# Patient Record
Sex: FEMALE | Race: WHITE | NOT HISPANIC OR LATINO | Employment: UNEMPLOYED | URBAN - METROPOLITAN AREA
[De-identification: names, ages, dates, MRNs, and addresses within clinical notes are randomized per-mention and may not be internally consistent; named-entity substitution may affect disease eponyms.]

---

## 2020-01-22 ENCOUNTER — HOSPITAL ENCOUNTER (EMERGENCY)
Facility: HOSPITAL | Age: 55
Discharge: HOME | End: 2020-01-22
Attending: EMERGENCY MEDICINE | Admitting: EMERGENCY MEDICINE

## 2020-01-22 VITALS
SYSTOLIC BLOOD PRESSURE: 104 MMHG | DIASTOLIC BLOOD PRESSURE: 54 MMHG | RESPIRATION RATE: 16 BRPM | TEMPERATURE: 97.4 F | HEART RATE: 89 BPM | OXYGEN SATURATION: 96 % | HEIGHT: 65 IN

## 2020-01-22 DIAGNOSIS — J02.9 VIRAL PHARYNGITIS: Primary | ICD-10-CM

## 2020-01-22 DIAGNOSIS — R05.9 COUGH: ICD-10-CM

## 2020-01-22 PROCEDURE — 99282 EMERGENCY DEPT VISIT SF MDM: CPT

## 2020-01-22 RX ORDER — BUSPIRONE HYDROCHLORIDE 15 MG/1
15 TABLET ORAL 3 TIMES DAILY
COMMUNITY

## 2020-01-22 RX ORDER — TRAZODONE HYDROCHLORIDE 50 MG/1
50 TABLET ORAL NIGHTLY
COMMUNITY

## 2020-01-22 RX ORDER — OMEPRAZOLE 20 MG/1
20 CAPSULE, DELAYED RELEASE ORAL DAILY
COMMUNITY

## 2020-01-22 RX ORDER — PREDNISONE 50 MG/1
50 TABLET ORAL DAILY
Qty: 3 TABLET | Refills: 0 | Status: SHIPPED | OUTPATIENT
Start: 2020-01-22 | End: 2020-01-25

## 2020-01-22 RX ORDER — PREDNISONE 10 MG/1
10 TABLET ORAL AS NEEDED
COMMUNITY
End: 2020-01-22

## 2020-01-22 RX ORDER — BENZONATATE 100 MG/1
100 CAPSULE ORAL 3 TIMES DAILY PRN
Qty: 9 CAPSULE | Refills: 0 | Status: SHIPPED | OUTPATIENT
Start: 2020-01-22 | End: 2020-01-25

## 2020-01-22 RX ORDER — LOSARTAN POTASSIUM 25 MG/1
25 TABLET ORAL DAILY
COMMUNITY

## 2020-01-22 NOTE — ED PROVIDER NOTES
EMERGENCY DEPARTMENT ENCOUNTER    Room Number:  33/33  Date of encounter:  1/22/2020  PCP: No primary care provider on file.  Historian: Patient      HPI:  Chief Complaint: Sore throat  A complete HPI/ROS/PMH/PSH/SH/FH are unobtainable due to: None    Context: Jenae Hernandes is a 54 y.o. female who presents to the ED c/o sore throat.  Onset 2 days ago.  Symptoms are constant.  Improved by nothing but worsens by nothing.  She has tried salt water rinses in her throat.  She is currently going through a sobriety program.  She reports sick contact with flu over, she does not think that she has the flu.  She reports having associated nonproductive cough as well as mild dull headache which she attributes to her not drinking.  No fever.  No shortness of breath.      PAST MEDICAL HISTORY  Active Ambulatory Problems     Diagnosis Date Noted   • No Active Ambulatory Problems     Resolved Ambulatory Problems     Diagnosis Date Noted   • No Resolved Ambulatory Problems     Past Medical History:   Diagnosis Date   • Diabetes mellitus (CMS/[facility])    • Hyperlipidemia    • Hypertension          PAST SURGICAL HISTORY  History reviewed. No pertinent surgical history.      FAMILY HISTORY  History reviewed. No pertinent family history.      SOCIAL HISTORY         ALLERGIES  Ciprofloxacin and Lortab [hydrocodone-acetaminophen]        REVIEW OF SYSTEMS  Review of Systems     All systems reviewed and negative except for those discussed in HPI.       PHYSICAL EXAM    I have reviewed the triage vital signs and nursing notes.    ED Triage Vitals   Temp Heart Rate Resp BP SpO2   01/22/20 0802 01/22/20 0802 01/22/20 0802 01/22/20 0818 01/22/20 0802   97.4 °F (36.3 °C) 92 18 104/54 96 %      Temp src Heart Rate Source Patient Position BP Location FiO2 (%)   01/22/20 0802 01/22/20 0802 -- -- --   Tympanic Monitor          Physical Exam  GENERAL: not distressed  HENT: nares patent, mild oropharyngeal erythema, uvula midline, no exudates  LYMPH:  No cervical lymphadenopathy  EYES: no scleral icterus  CV: regular rhythm, regular rate  RESPIRATORY: normal effort, clear to auscultation bilaterally  ABDOMEN: soft, obese abdomen, nontender  MUSCULOSKELETAL: no deformity  NEURO: alert, moves all extremities, follows commands  SKIN: warm, dry        LAB RESULTS  No results found for this or any previous visit (from the past 24 hour(s)).    Ordered the above labs and independently reviewed the results.        RADIOLOGY  No Radiology Exams Resulted Within Past 24 Hours    I ordered the above noted radiological studies. Reviewed by me and discussed with radiologist.  See dictation for official radiology interpretation.      PROCEDURES    Procedures      MEDICATIONS GIVEN IN ER    Medications - No data to display      PROGRESS, DATA ANALYSIS, CONSULTS, AND MEDICAL DECISION MAKING    All labs have been independently reviewed by me.  All radiology studies have been reviewed by me and discussed with radiologist dictating the report.   EKG's independently viewed and interpreted by me.  Discussion below represents my analysis of pertinent findings related to patient's condition, differential diagnosis, treatment plan and final disposition.    Patient presents today requesting a Z-Luan.  I do not think this is bacterial in nature and do not believe it is appropriate to prescribe this.  Patient really would like some medicine help with her symptoms.  Therefore many give her some prednisone and Tessalon Perles.             AS OF 8:21 AM VITALS:    BP - 104/54  HR - 89  TEMP - 97.4 °F (36.3 °C) (Tympanic)  O2 SATS - 96%        DIAGNOSIS  Final diagnoses:   Viral pharyngitis   Cough         DISPOSITION  DISCHARGE    FOLLOW-UP  PATIENT LIAISON [facility] [address]  [phone]  Schedule an appointment as soon as possible for a visit in 1 day  If symptoms worsen         Medication List      New Prescriptions    benzonatate 100 MG capsule  Commonly known as:   TESSALON  Take 1 capsule by mouth 3 (Three) Times a Day As Needed for Cough for up   to 3 days.        Changed    predniSONE 50 MG tablet  Commonly known as:  DELTASONE  Take 1 tablet by mouth Daily for 3 days.  What changed:    medication strength  how much to take  when to take this  reasons to take this                   Corey Frias II, MD  01/22/20 3464

## 2020-01-22 NOTE — ED TRIAGE NOTES
"Pt complains of a sore throat for the last 2 days. States that she also has a headache. Pt states \"I've been in sober living. I've been sober for 10 days.\" Reports that her roommate had the flu 2 weeks ago.   "

## 2020-01-28 ENCOUNTER — HOSPITAL ENCOUNTER (EMERGENCY)
Facility: HOSPITAL | Age: 55
Discharge: HOME | End: 2020-01-28
Attending: EMERGENCY MEDICINE | Admitting: EMERGENCY MEDICINE

## 2020-01-28 ENCOUNTER — APPOINTMENT (OUTPATIENT)
Dept: GENERAL RADIOLOGY | Facility: HOSPITAL | Age: 55
End: 2020-01-28

## 2020-01-28 VITALS
HEART RATE: 87 BPM | HEIGHT: 65 IN | SYSTOLIC BLOOD PRESSURE: 136 MMHG | WEIGHT: 248 LBS | DIASTOLIC BLOOD PRESSURE: 78 MMHG | BODY MASS INDEX: 41.32 KG/M2 | OXYGEN SATURATION: 98 % | RESPIRATION RATE: 17 BRPM | TEMPERATURE: 98.3 F

## 2020-01-28 DIAGNOSIS — R06.2 WHEEZING: ICD-10-CM

## 2020-01-28 DIAGNOSIS — J06.9 VIRAL URI WITH COUGH: Primary | ICD-10-CM

## 2020-01-28 DIAGNOSIS — F17.200 TOBACCO DEPENDENCE: ICD-10-CM

## 2020-01-28 PROCEDURE — 71046 X-RAY EXAM CHEST 2 VIEWS: CPT

## 2020-01-28 PROCEDURE — 94640 AIRWAY INHALATION TREATMENT: CPT

## 2020-01-28 PROCEDURE — 63710000001 PREDNISONE PER 1 MG: Performed by: EMERGENCY MEDICINE

## 2020-01-28 PROCEDURE — 99283 EMERGENCY DEPT VISIT LOW MDM: CPT

## 2020-01-28 RX ORDER — BENZONATATE 100 MG/1
100 CAPSULE ORAL 3 TIMES DAILY PRN
Qty: 9 CAPSULE | Refills: 0 | Status: SHIPPED | OUTPATIENT
Start: 2020-01-28 | End: 2020-01-31

## 2020-01-28 RX ORDER — PREDNISONE 50 MG/1
50 TABLET ORAL DAILY
Qty: 5 TABLET | Refills: 0 | Status: SHIPPED | OUTPATIENT
Start: 2020-01-28 | End: 2020-02-02

## 2020-01-28 RX ORDER — PREDNISONE 20 MG/1
60 TABLET ORAL ONCE
Status: COMPLETED | OUTPATIENT
Start: 2020-01-28 | End: 2020-01-28

## 2020-01-28 RX ORDER — IPRATROPIUM BROMIDE AND ALBUTEROL SULFATE 2.5; .5 MG/3ML; MG/3ML
3 SOLUTION RESPIRATORY (INHALATION) ONCE
Status: COMPLETED | OUTPATIENT
Start: 2020-01-28 | End: 2020-01-28

## 2020-01-28 RX ORDER — AMOXICILLIN AND CLAVULANATE POTASSIUM 875; 125 MG/1; MG/1
1 TABLET, FILM COATED ORAL 2 TIMES DAILY
Qty: 10 TABLET | Refills: 0 | Status: SHIPPED | OUTPATIENT
Start: 2020-01-28 | End: 2020-02-02

## 2020-01-28 RX ADMIN — IPRATROPIUM BROMIDE AND ALBUTEROL SULFATE 3 ML: 2.5; .5 SOLUTION RESPIRATORY (INHALATION) at 10:27

## 2020-01-28 RX ADMIN — PREDNISONE 60 MG: 20 TABLET ORAL at 10:37

## 2020-01-28 NOTE — ED PROVIDER NOTES
EMERGENCY DEPARTMENT ENCOUNTER    Room Number:  38/38  Date of encounter:  1/28/2020  PCP: Rony Damon II, MD  Historian: Patient      HPI:  Chief Complaint: Cough  A complete HPI/ROS/PMH/PSH/SH/FH are unobtainable due to: None    Context: Jenae Hernandes is a 54 y.o. female who presents to the ED c/o cough.  Onset 7 days ago.  Symptoms are constant.  Improved by nothing.  Worsens by nothing.  She reports numerous sick contacts with the flu although she does not think that she has a flu.  She is currently living at sober living.  She does have any fever or shortness of breath.  She states that she would like some antibiotics.  She is a smoker.      PAST MEDICAL HISTORY  Active Ambulatory Problems     Diagnosis Date Noted   • No Active Ambulatory Problems     Resolved Ambulatory Problems     Diagnosis Date Noted   • No Resolved Ambulatory Problems     Past Medical History:   Diagnosis Date   • Diabetes mellitus (CMS/[facility])    • Hyperlipidemia    • Hypertension          PAST SURGICAL HISTORY  History reviewed. No pertinent surgical history.      FAMILY HISTORY  History reviewed. No pertinent family history.      SOCIAL HISTORY  Social History     Socioeconomic History   • Marital status: Single     Spouse name: Not on file   • Number of children: Not on file   • Years of education: Not on file   • Highest education level: Not on file   Tobacco Use   • Smoking status: Former Smoker   Substance and Sexual Activity   • Alcohol use: Never     Frequency: Never   • Drug use: Never   • Sexual activity: Defer         ALLERGIES  Ciprofloxacin and Lortab [hydrocodone-acetaminophen]        REVIEW OF SYSTEMS  Review of Systems     All systems reviewed and negative except for those discussed in HPI.       PHYSICAL EXAM    I have reviewed the triage vital signs and nursing notes.    ED Triage Vitals   Temp Heart Rate Resp BP SpO2   01/28/20 0716 01/28/20 0716 01/28/20 0716 01/28/20 0755 01/28/20 0716   98.3 °F (36.8 °C) 91 16  147/83 98 %      Temp src Heart Rate Source Patient Position BP Location FiO2 (%)   01/28/20 0716 01/28/20 0716 -- -- --   Tympanic Monitor          Physical Exam  GENERAL: not distressed  HENT: nares patent, TMs clear bilaterally  Oropharynx clear, uvula midline  LYMPH: No cervical lymphadenopathy  EYES: no scleral icterus  CV: regular rhythm, regular rate  RESPIRATORY: normal effort, expiratory wheezing bilaterally  ABDOMEN: soft, nontender  MUSCULOSKELETAL: no deformity  NEURO: alert, moves all extremities, follows commands  SKIN: warm, dry        LAB RESULTS  No results found for this or any previous visit (from the past 24 hour(s)).    Ordered the above labs and independently reviewed the results.        RADIOLOGY  Xr Chest 2 View    Result Date: 1/28/2020  Chest radiograph  HISTORY:Cough  TECHNIQUE: Two PA and lateral radiographs  COMPARISON:None  FINDINGS: The lungs are clear. There is no pleural effusion. No pneumothorax is seen. The heart is normal in size.      No findings of acute cardiopulmonary pathology.  This report was finalized on 1/28/2020 9:53 AM by Dr. René Sam M.D.        I ordered the above noted radiological studies. Reviewed by me and discussed with radiologist.  See dictation for official radiology interpretation.      PROCEDURES    Procedures      MEDICATIONS GIVEN IN ER    Medications   ipratropium-albuterol (DUO-NEB) nebulizer solution 3 mL (3 mL Nebulization Given 1/28/20 1027)   predniSONE (DELTASONE) tablet 60 mg (60 mg Oral Given 1/28/20 1037)         PROGRESS, DATA ANALYSIS, CONSULTS, AND MEDICAL DECISION MAKING    All labs have been independently reviewed by me.  All radiology studies have been reviewed by me and discussed with radiologist dictating the report.   EKG's independently viewed and interpreted by me.  Discussion below represents my analysis of pertinent findings related to patient's condition, differential diagnosis, treatment plan and final  disposition.    Differential diagnosis includes pneumonia, influenza, viral syndrome, undiagnosed COPD with exacerbation.  She is clinically well-appearing.  No evidence of otitis media.  She is speaking in full sentences.    On medical chart review, I saw the patient on 1/22/2020.  She was given azithromycin as well as steroids.    Smoking cessation counseling  I counseled the patient face to face > 3 minutes and < 10 minutes to quit the use of tobacco and provided tobacco cessation strategies.      ED Course as of Jan 28 2203   Tue Jan 28, 2020   0957 Chest x-ray interpreted by myself.  No evidence of pneumonia or pneumothorax.    [TD]   0957 Per communication with Dr. Hand, x-rays acutely negative.    [TD]   1032 Patient is requesting discharge.  She states that her boss is here and she wants to go now.    [TD]      ED Course User Index  [TD] Corey Frias II, MD           AS OF 10:03 PM VITALS:    BP - 136/78  HR - 87  TEMP - 98.3 °F (36.8 °C) (Tympanic)  O2 SATS - 98%        DIAGNOSIS  Final diagnoses:   Viral URI with cough   Wheezing   Tobacco dependence         DISPOSITION  DISCHARGE    FOLLOW-UP  Rony Damon II, MD  [address] [id number] [phone]    Schedule an appointment as soon as possible for a visit   As needed         Medication List      New Prescriptions    amoxicillin-clavulanate 875-125 MG per tablet  Commonly known as:  AUGMENTIN  Take 1 tablet by mouth 2 (Two) Times a Day for 5 days.     benzonatate 100 MG capsule  Commonly known as:  TESSALON  Take 1 capsule by mouth 3 (Three) Times a Day As Needed for Cough for up   to 3 days.     predniSONE 50 MG tablet  Commonly known as:  DELTASONE  Take 1 tablet by mouth Daily for 5 days.                   Corey Frias II, MD  01/28/20 2203

## 2020-01-28 NOTE — ED TRIAGE NOTES
Was here Friday for same and was given 3 steroid pills.  Continues to have cough and congestion and HA.  Has been coughing up a lot of yellow mucous. Also has left ear pain